# Patient Record
Sex: MALE | Race: WHITE | NOT HISPANIC OR LATINO | URBAN - METROPOLITAN AREA
[De-identification: names, ages, dates, MRNs, and addresses within clinical notes are randomized per-mention and may not be internally consistent; named-entity substitution may affect disease eponyms.]

---

## 2020-01-06 ENCOUNTER — WALK IN (OUTPATIENT)
Dept: URGENT CARE | Age: 45
End: 2020-01-06

## 2020-01-06 VITALS
SYSTOLIC BLOOD PRESSURE: 124 MMHG | HEART RATE: 72 BPM | DIASTOLIC BLOOD PRESSURE: 74 MMHG | OXYGEN SATURATION: 97 % | TEMPERATURE: 98.1 F | BODY MASS INDEX: 32.14 KG/M2 | HEIGHT: 66 IN | WEIGHT: 200 LBS | RESPIRATION RATE: 18 BRPM

## 2020-01-06 DIAGNOSIS — J20.9 ACUTE BRONCHITIS, UNSPECIFIED ORGANISM: Primary | ICD-10-CM

## 2020-01-06 PROCEDURE — 99203 OFFICE O/P NEW LOW 30 MIN: CPT | Performed by: NURSE PRACTITIONER

## 2020-01-06 RX ORDER — METHYLPREDNISOLONE 4 MG
TABLET, DOSE PACK ORAL
Qty: 21 TABLET | Refills: 0 | Status: SHIPPED | OUTPATIENT
Start: 2020-01-06

## 2020-01-06 RX ORDER — BENZONATATE 100 MG/1
100 CAPSULE ORAL 3 TIMES DAILY PRN
Qty: 20 CAPSULE | Refills: 0 | Status: SHIPPED | OUTPATIENT
Start: 2020-01-06

## 2020-01-06 RX ORDER — ALBUTEROL SULFATE 90 UG/1
AEROSOL, METERED RESPIRATORY (INHALATION)
Qty: 1 INHALER | Refills: 0 | Status: SHIPPED | OUTPATIENT
Start: 2020-01-06

## 2020-01-06 SDOH — HEALTH STABILITY: MENTAL HEALTH: HOW OFTEN DO YOU HAVE A DRINK CONTAINING ALCOHOL?: MONTHLY OR LESS

## 2020-01-06 ASSESSMENT — ENCOUNTER SYMPTOMS
CHEST TIGHTNESS: 0
FATIGUE: 1
CHILLS: 0
COUGH: 1
SINUS PRESSURE: 1
NEUROLOGICAL NEGATIVE: 1
FEVER: 0
SORE THROAT: 0
TROUBLE SWALLOWING: 0
EYES NEGATIVE: 1
SHORTNESS OF BREATH: 0
STRIDOR: 0
VOICE CHANGE: 0
WHEEZING: 1
RHINORRHEA: 0

## 2025-04-09 ENCOUNTER — HOSPITAL ENCOUNTER (OUTPATIENT)
Age: 50
Discharge: HOME | End: 2025-04-09
Payer: COMMERCIAL

## 2025-04-09 ENCOUNTER — APPOINTMENT (OUTPATIENT)
Dept: GENERAL RADIOLOGY | Age: 50
End: 2025-04-09
Attending: NURSE PRACTITIONER
Payer: COMMERCIAL

## 2025-04-09 VITALS
DIASTOLIC BLOOD PRESSURE: 84 MMHG | HEART RATE: 100 BPM | WEIGHT: 205 LBS | RESPIRATION RATE: 20 BRPM | TEMPERATURE: 100 F | SYSTOLIC BLOOD PRESSURE: 135 MMHG | OXYGEN SATURATION: 98 %

## 2025-04-09 DIAGNOSIS — J06.9 VIRAL URI WITH COUGH: Primary | ICD-10-CM

## 2025-04-09 LAB
POCT INFLUENZA A: NEGATIVE
POCT INFLUENZA B: NEGATIVE
S PYO AG THROAT QL IA.RAPID: NEGATIVE
SARS-COV-2 RNA RESP QL NAA+PROBE: NOT DETECTED

## 2025-04-09 PROCEDURE — 99204 OFFICE O/P NEW MOD 45 MIN: CPT

## 2025-04-09 PROCEDURE — 87651 STREP A DNA AMP PROBE: CPT | Performed by: NURSE PRACTITIONER

## 2025-04-09 PROCEDURE — 87502 INFLUENZA DNA AMP PROBE: CPT | Performed by: NURSE PRACTITIONER

## 2025-04-09 PROCEDURE — 71046 X-RAY EXAM CHEST 2 VIEWS: CPT | Performed by: NURSE PRACTITIONER

## 2025-04-09 RX ORDER — ACETAMINOPHEN 500 MG
1000 TABLET ORAL ONCE
Status: COMPLETED | OUTPATIENT
Start: 2025-04-09 | End: 2025-04-09

## 2025-04-09 NOTE — ED PROVIDER NOTES
Patient Seen in: [facility]      History   No chief complaint on file.    Stated Complaint: Fever    Subjective:   This is a 49-year-old male with below stated medical history.  Presents to immediate care for fevers, fatigue, generalized weakness, sore throat, and cough.  Symptom started yesterday.  Patient states he was traveling last week.  His son was also sick with upper respiratory symptoms.  No chest pain or shortness of breath.  No abdominal pain, nausea, or vomiting.  Took 1 dose of cold and flu medicine earlier today with little relief.     The history is provided by the patient.             Objective:     No pertinent past medical history.            No pertinent past surgical history.              No pertinent social history.            Review of Systems   Constitutional:  Positive for chills, fatigue and fever.   HENT:  Positive for congestion and sore throat.    Respiratory:  Positive for cough. Negative for shortness of breath, wheezing and stridor.    Cardiovascular:  Negative for chest pain, palpitations and leg swelling.   Gastrointestinal:  Negative for abdominal pain, constipation, diarrhea, nausea and vomiting.   Genitourinary:  Negative for dysuria.   Musculoskeletal:  Negative for back pain, neck pain and neck stiffness.   Skin:  Negative for rash.   Neurological:  Negative for headaches.   Hematological:  Does not bruise/bleed easily.       Positive for stated complaint: Fever  Other systems are as noted in HPI.  Constitutional and vital signs reviewed.      All other systems reviewed and negative except as noted above.    Physical Exam     ED Triage Vitals [04/09/25 1716]   /84   Pulse 100   Resp 20   Temp 100.4 °F (38 °C)   Temp src Oral   SpO2 98 %   O2 Device None (Room air)       Current Vitals:   Vital Signs  BP: 135/84  Pulse: 100  Resp: 20  Temp: 100.4 °F (38 °C)  Temp src: Oral    Oxygen Therapy  SpO2: 98 %  O2 Device: None (Room air)        Physical  Exam  Vitals and nursing note reviewed.   Constitutional:       General: He is not in acute distress.     Appearance: Normal appearance. He is not ill-appearing, toxic-appearing or diaphoretic.   HENT:      Head: Normocephalic and atraumatic.      Right Ear: Tympanic membrane, ear canal and external ear normal. There is no impacted cerumen.      Left Ear: Tympanic membrane and external ear normal. There is no impacted cerumen.      Nose: Congestion and rhinorrhea present.      Mouth/Throat:      Mouth: Mucous membranes are moist.      Pharynx: Oropharynx is clear. Posterior oropharyngeal erythema present. No oropharyngeal exudate.   Eyes:      General:         Right eye: No discharge.         Left eye: No discharge.      Extraocular Movements: Extraocular movements intact.      Conjunctiva/sclera: Conjunctivae normal.   Cardiovascular:      Rate and Rhythm: Normal rate.      Heart sounds: Normal heart sounds. No murmur heard.  Pulmonary:      Effort: Pulmonary effort is normal. No respiratory distress.      Breath sounds: Normal breath sounds. No stridor. No wheezing, rhonchi or rales.   Musculoskeletal:      Cervical back: Neck supple.      Right lower leg: No edema.      Left lower leg: No edema.   Skin:     General: Skin is warm and dry.      Capillary Refill: Capillary refill takes less than 2 seconds.      Findings: No rash.   Neurological:      Mental Status: He is alert and oriented to person, place, and time.   Psychiatric:         Mood and Affect: Mood normal.         Behavior: Behavior normal.             ED Course     Labs Reviewed   RAPID STREP A - Normal   POCT FLU TEST - Normal    Narrative:     This assay is a rapid molecular in vitro test utilizing nucleic acid amplification of influenza A and B viral RNA.   RAPID SARS-COV-2 BY PCR - Normal            Rapid flu, rapid covid, rapid strep       MDM      Vital signs stable. Patient is well-appearing and nontoxic looking. Presents to immediate care for  upper respiratory symptoms.    Differential diagnosis include but not limited to COVID, viral sinusitis, influenza, other viral URI, pneumonia     Lung sounds clear bilaterally. No respiratory distress or hypoxia.  Chest x-ray films interpreted and reviewed myself.  Results show no acute consolidation.       Rapid strep, rapid flu, rapid COVID are all negative.    Clinically all symptoms appear viral.    DC home. Recommended over-the-counter cough medicines and throat lozenges.  Tylenol and/or ibuprofen for pain or fever.  Infection precautions reviewed.  The importance of oral hydration and close follow-up with primary provider in 1 week discussed. Reasons to seek emergent treatment reinforced. Patient verbalized understanding, and agreed with plan of care. All questions answered.          Medical Decision Making      Disposition and Plan     Clinical Impression:  1. Viral URI with cough         Disposition:  Discharge  4/9/2025  6:04 pm    Follow-up:  Your PMD    In 1 week            Medications Prescribed:  Current Discharge Medication List              Supplementary Documentation:

## 2025-04-14 ENCOUNTER — HOSPITAL ENCOUNTER (OUTPATIENT)
Age: 50
Discharge: HOME | End: 2025-04-14
Attending: EMERGENCY MEDICINE
Payer: COMMERCIAL

## 2025-04-14 VITALS
OXYGEN SATURATION: 99 % | HEART RATE: 65 BPM | TEMPERATURE: 98 F | SYSTOLIC BLOOD PRESSURE: 152 MMHG | WEIGHT: 205 LBS | DIASTOLIC BLOOD PRESSURE: 91 MMHG | RESPIRATION RATE: 18 BRPM

## 2025-04-14 DIAGNOSIS — H66.001 RIGHT ACUTE SUPPURATIVE OTITIS MEDIA: ICD-10-CM

## 2025-04-14 DIAGNOSIS — J40 BRONCHITIS: Primary | ICD-10-CM

## 2025-04-14 DIAGNOSIS — J98.01 BRONCHOSPASM: ICD-10-CM

## 2025-04-14 PROCEDURE — 99213 OFFICE O/P EST LOW 20 MIN: CPT

## 2025-04-14 RX ORDER — ALBUTEROL SULFATE 90 UG/1
2 INHALANT RESPIRATORY (INHALATION) EVERY 4 HOURS PRN
Qty: 1 EACH | Refills: 0 | Status: SHIPPED | OUTPATIENT
Start: 2025-04-14 | End: 2025-05-14

## 2025-04-14 NOTE — ED INITIAL ASSESSMENT (HPI)
Lorazepam 0.5mg       Last Written Prescription Date:  04.13.20  Last Fill Quantity: 30,   # refills: 0  Last Office Visit:  Future Office visit:       Routing refill request to provider for review/approval because:  Drug not on the [facility], [facility] or [facility] refill protocol or controlled substance   Pt c/o cough feeling lynnette at times, pt seen here 1 week ago for fever, fatigue and bodyaches with neg swabs

## 2025-04-14 NOTE — DISCHARGE INSTRUCTIONS
Push fluids  Over-the-counter daytime/nighttime cold and cough medication  Albuterol inhaler with spacer-2 puffs about every 4-6 hours as needed for cough  Augmentin antibiotic    Contact a primary care provider today to arrange follow-up in about a week

## 2025-04-14 NOTE — ED PROVIDER NOTES
Patient Seen in: [facility]    History   No chief complaint on file.    Stated Complaint: URI; F/U Wednesday; Worsening Cough and Phlem Production    Subjective:   HPI      Patient was seen 5 days ago for fever, generalized weakness, sore throat, and cough.  Symptoms have been going on for a day at that point.  There was no chest pain or shortness of breath.  Rapid strep, flu, COVID, and chest x-ray all normal.  Patient discharged home with supportive care.   Patient reports that his symptoms are no better.  His cough is now productive of purulent sputum.  No fevers.  He has developed fullness in the right ear.        Objective:     History reviewed. No pertinent past medical history.           Past Surgical History:   Procedure Laterality Date    Other surgical history Left 04/2024    humeral fx with plate and screws                Social History     Socioeconomic History    Marital status:    Tobacco Use    Smoking status: Never    Smokeless tobacco: Never   Vaping Use    Vaping status: Never Used   Substance and Sexual Activity    Alcohol use: Yes     Comment: social    Drug use: Never              Review of Systems    Positive for stated complaint: URI; F/U Wednesday; Worsening Cough and Phlem Production  Other systems are as noted in HPI.  Constitutional and vital signs reviewed.      All other systems reviewed and negative except as noted above.    Physical Exam     ED Triage Vitals [04/14/25 1410]   BP (!) 152/91   Pulse 65   Resp 18   Temp 98.3 °F (36.8 °C)   Temp src Oral   SpO2 99 %   O2 Device None (Room air)       Current Vitals:   Vital Signs  BP: (!) 152/91  Pulse: 65  Resp: 18  Temp: 98.3 °F (36.8 °C)  Temp src: Oral    Oxygen Therapy  SpO2: 99 %  O2 Device: None (Room air)        Physical Exam  General: The patient is awake, alert, conversant.  Patient coughs occasionally cough has a wheezy bronchospastic quality  Eyes: sclera white, conjunctiva pink and moist.  Lids and  lashes are normal.  Ears: Right TM is erythematous.  Left TM normal   Throat: Posterior pharynx nonerythematous with no exudate.  Oromucosa and lips are moist  Lungs: Clear to auscultation bilaterally.  Wheeze with forced expiration cough.  No rhonchi or rales.  Heart: Normal S1 and S2, without murmur.   Neurologic:  Mental status as above.  Patient moves all extremities with good strength and coordination.        ED Course   Labs Reviewed - No data to display                              MDM     Patient presents with a week worth of what sounds like viral illness initially now with purulent productive cough suggesting bronchitis.  There is bronchospasm on exam.  There is also right-sided otitis media noted.  Treatment with antibiotic and bronchodilator would be indicated.  I will hold off on steroids at this point.    I recommend:  Push fluids  Over-the-counter daytime/nighttime cold and cough medication  Albuterol inhaler with spacer-2 puffs about every 4-6 hours as needed for cough  Augmentin antibiotic    Contact a primary care provider today to arrange follow-up in about a week      Medical Decision Making      Disposition and Plan     Clinical Impression:  1. Bronchitis    2. Bronchospasm    3. Right acute suppurative otitis media         Disposition:  Discharge  4/14/2025  2:19 pm    Follow-up:  No follow-up provider specified.        Medications Prescribed:  Current Discharge Medication List        START taking these medications    Details   amoxicillin clavulanate 875-125 MG Oral Tab Take 1 tablet by mouth 2 (two) times daily for 10 days.  Qty: 20 tablet, Refills: 0      albuterol 108 (90 Base) MCG/ACT Inhalation Aero Soln Inhale 2 puffs into the lungs every 4 (four) hours as needed for Wheezing.  Qty: 1 each, Refills: 0             Supplementary Documentation: